# Patient Record
Sex: FEMALE | Race: BLACK OR AFRICAN AMERICAN | Employment: FULL TIME | URBAN - METROPOLITAN AREA
[De-identification: names, ages, dates, MRNs, and addresses within clinical notes are randomized per-mention and may not be internally consistent; named-entity substitution may affect disease eponyms.]

---

## 2018-07-24 ENCOUNTER — APPOINTMENT (OUTPATIENT)
Dept: GENERAL RADIOLOGY | Age: 43
DRG: 310 | End: 2018-07-24
Payer: COMMERCIAL

## 2018-07-24 ENCOUNTER — HOSPITAL ENCOUNTER (INPATIENT)
Age: 43
LOS: 1 days | Discharge: HOME | DRG: 310 | End: 2018-07-25
Attending: EMERGENCY MEDICINE | Admitting: FAMILY MEDICINE
Payer: COMMERCIAL

## 2018-07-24 DIAGNOSIS — R00.0 TACHYCARDIA: Primary | ICD-10-CM

## 2018-07-24 PROBLEM — I47.9 PAROXYSMAL TACHYCARDIA (HCC): Status: ACTIVE | Noted: 2018-07-24

## 2018-07-24 LAB
ABSOLUTE EOS #: 0.1 K/UL (ref 0–0.4)
ABSOLUTE IMMATURE GRANULOCYTE: ABNORMAL K/UL (ref 0–0.3)
ABSOLUTE LYMPH #: 2.2 K/UL (ref 1–4.8)
ABSOLUTE MONO #: 0.6 K/UL (ref 0.1–1.3)
ALBUMIN SERPL-MCNC: 4.5 G/DL (ref 3.5–5.2)
ALBUMIN/GLOBULIN RATIO: ABNORMAL (ref 1–2.5)
ALP BLD-CCNC: 48 U/L (ref 35–104)
ALT SERPL-CCNC: 10 U/L (ref 5–33)
ANION GAP SERPL CALCULATED.3IONS-SCNC: 14 MMOL/L (ref 9–17)
AST SERPL-CCNC: 16 U/L
BASOPHILS # BLD: 1 % (ref 0–2)
BASOPHILS ABSOLUTE: 0.1 K/UL (ref 0–0.2)
BILIRUB SERPL-MCNC: 0.9 MG/DL (ref 0.3–1.2)
BUN BLDV-MCNC: 11 MG/DL (ref 6–20)
BUN/CREAT BLD: ABNORMAL (ref 9–20)
CALCIUM SERPL-MCNC: 9.4 MG/DL (ref 8.6–10.4)
CHLORIDE BLD-SCNC: 101 MMOL/L (ref 98–107)
CO2: 22 MMOL/L (ref 20–31)
CREAT SERPL-MCNC: 0.68 MG/DL (ref 0.5–0.9)
DIFFERENTIAL TYPE: ABNORMAL
EOSINOPHILS RELATIVE PERCENT: 1 % (ref 0–4)
GFR AFRICAN AMERICAN: >60 ML/MIN
GFR NON-AFRICAN AMERICAN: >60 ML/MIN
GFR SERPL CREATININE-BSD FRML MDRD: ABNORMAL ML/MIN/{1.73_M2}
GFR SERPL CREATININE-BSD FRML MDRD: ABNORMAL ML/MIN/{1.73_M2}
GLUCOSE BLD-MCNC: 98 MG/DL (ref 70–99)
HCT VFR BLD CALC: 36.5 % (ref 36–46)
HEMOGLOBIN: 13.3 G/DL (ref 12–16)
IMMATURE GRANULOCYTES: ABNORMAL %
LYMPHOCYTES # BLD: 28 % (ref 24–44)
MAGNESIUM: 2.3 MG/DL (ref 1.6–2.6)
MCH RBC QN AUTO: 32.5 PG (ref 26–34)
MCHC RBC AUTO-ENTMCNC: 36.4 G/DL (ref 31–37)
MCV RBC AUTO: 89.3 FL (ref 80–100)
MONOCYTES # BLD: 8 % (ref 1–7)
NRBC AUTOMATED: ABNORMAL PER 100 WBC
PDW BLD-RTO: 14.8 % (ref 11.5–14.9)
PLATELET # BLD: 496 K/UL (ref 150–450)
PLATELET ESTIMATE: ABNORMAL
PMV BLD AUTO: 8.8 FL (ref 6–12)
POTASSIUM SERPL-SCNC: 3.6 MMOL/L (ref 3.7–5.3)
RBC # BLD: 4.09 M/UL (ref 4–5.2)
RBC # BLD: ABNORMAL 10*6/UL
SEG NEUTROPHILS: 62 % (ref 36–66)
SEGMENTED NEUTROPHILS ABSOLUTE COUNT: 4.8 K/UL (ref 1.3–9.1)
SODIUM BLD-SCNC: 137 MMOL/L (ref 135–144)
TOTAL PROTEIN: 7.5 G/DL (ref 6.4–8.3)
TROPONIN INTERP: NORMAL
TROPONIN T: <0.03 NG/ML
TSH SERPL DL<=0.05 MIU/L-ACNC: 1.28 MIU/L (ref 0.3–5)
WBC # BLD: 7.9 K/UL (ref 3.5–11)
WBC # BLD: ABNORMAL 10*3/UL

## 2018-07-24 PROCEDURE — 2060000000 HC ICU INTERMEDIATE R&B

## 2018-07-24 PROCEDURE — 83735 ASSAY OF MAGNESIUM: CPT

## 2018-07-24 PROCEDURE — 71045 X-RAY EXAM CHEST 1 VIEW: CPT

## 2018-07-24 PROCEDURE — 2580000003 HC RX 258: Performed by: FAMILY MEDICINE

## 2018-07-24 PROCEDURE — 6370000000 HC RX 637 (ALT 250 FOR IP): Performed by: FAMILY MEDICINE

## 2018-07-24 PROCEDURE — 2580000003 HC RX 258: Performed by: EMERGENCY MEDICINE

## 2018-07-24 PROCEDURE — G0378 HOSPITAL OBSERVATION PER HR: HCPCS

## 2018-07-24 PROCEDURE — 99285 EMERGENCY DEPT VISIT HI MDM: CPT

## 2018-07-24 PROCEDURE — 36415 COLL VENOUS BLD VENIPUNCTURE: CPT

## 2018-07-24 PROCEDURE — 80053 COMPREHEN METABOLIC PANEL: CPT

## 2018-07-24 PROCEDURE — 93005 ELECTROCARDIOGRAM TRACING: CPT

## 2018-07-24 PROCEDURE — 84484 ASSAY OF TROPONIN QUANT: CPT

## 2018-07-24 PROCEDURE — 84443 ASSAY THYROID STIM HORMONE: CPT

## 2018-07-24 PROCEDURE — 85025 COMPLETE CBC W/AUTO DIFF WBC: CPT

## 2018-07-24 RX ORDER — ACETAMINOPHEN 325 MG/1
650 TABLET ORAL EVERY 4 HOURS PRN
Status: DISCONTINUED | OUTPATIENT
Start: 2018-07-24 | End: 2018-07-25 | Stop reason: HOSPADM

## 2018-07-24 RX ORDER — FAMOTIDINE 20 MG/1
20 TABLET, FILM COATED ORAL 2 TIMES DAILY
Status: DISCONTINUED | OUTPATIENT
Start: 2018-07-25 | End: 2018-07-25 | Stop reason: HOSPADM

## 2018-07-24 RX ORDER — 0.9 % SODIUM CHLORIDE 0.9 %
1000 INTRAVENOUS SOLUTION INTRAVENOUS ONCE
Status: COMPLETED | OUTPATIENT
Start: 2018-07-24 | End: 2018-07-24

## 2018-07-24 RX ORDER — ALPRAZOLAM 0.25 MG/1
0.25 TABLET ORAL NIGHTLY PRN
Status: DISCONTINUED | OUTPATIENT
Start: 2018-07-25 | End: 2018-07-24

## 2018-07-24 RX ORDER — MAGNESIUM SULFATE 1 G/100ML
1 INJECTION INTRAVENOUS PRN
Status: DISCONTINUED | OUTPATIENT
Start: 2018-07-24 | End: 2018-07-25 | Stop reason: HOSPADM

## 2018-07-24 RX ORDER — ONDANSETRON 2 MG/ML
4 INJECTION INTRAMUSCULAR; INTRAVENOUS EVERY 6 HOURS PRN
Status: DISCONTINUED | OUTPATIENT
Start: 2018-07-24 | End: 2018-07-25 | Stop reason: HOSPADM

## 2018-07-24 RX ORDER — SODIUM CHLORIDE 0.9 % (FLUSH) 0.9 %
10 SYRINGE (ML) INJECTION PRN
Status: DISCONTINUED | OUTPATIENT
Start: 2018-07-24 | End: 2018-07-25 | Stop reason: HOSPADM

## 2018-07-24 RX ORDER — AMLODIPINE BESYLATE 5 MG/1
5 TABLET ORAL DAILY
COMMUNITY

## 2018-07-24 RX ORDER — SODIUM CHLORIDE 0.9 % (FLUSH) 0.9 %
10 SYRINGE (ML) INJECTION EVERY 12 HOURS SCHEDULED
Status: DISCONTINUED | OUTPATIENT
Start: 2018-07-25 | End: 2018-07-25 | Stop reason: HOSPADM

## 2018-07-24 RX ORDER — SODIUM CHLORIDE 0.9 % (FLUSH) 0.9 %
10 SYRINGE (ML) INJECTION EVERY 12 HOURS SCHEDULED
Status: DISCONTINUED | OUTPATIENT
Start: 2018-07-24 | End: 2018-07-25 | Stop reason: HOSPADM

## 2018-07-24 RX ORDER — MECLIZINE HYDROCHLORIDE 25 MG/1
25 TABLET ORAL 3 TIMES DAILY PRN
Status: DISCONTINUED | OUTPATIENT
Start: 2018-07-24 | End: 2018-07-25 | Stop reason: HOSPADM

## 2018-07-24 RX ORDER — ALBUTEROL SULFATE 2.5 MG/3ML
2.5 SOLUTION RESPIRATORY (INHALATION) EVERY 4 HOURS
Status: DISCONTINUED | OUTPATIENT
Start: 2018-07-25 | End: 2018-07-25

## 2018-07-24 RX ORDER — ALPRAZOLAM 0.25 MG/1
0.25 TABLET ORAL NIGHTLY PRN
Status: DISCONTINUED | OUTPATIENT
Start: 2018-07-24 | End: 2018-07-25 | Stop reason: HOSPADM

## 2018-07-24 RX ORDER — TOPIRAMATE 25 MG/1
25 TABLET ORAL 2 TIMES DAILY
Status: DISCONTINUED | OUTPATIENT
Start: 2018-07-25 | End: 2018-07-25 | Stop reason: HOSPADM

## 2018-07-24 RX ORDER — AMLODIPINE BESYLATE 5 MG/1
5 TABLET ORAL DAILY
Status: DISCONTINUED | OUTPATIENT
Start: 2018-07-25 | End: 2018-07-25 | Stop reason: HOSPADM

## 2018-07-24 RX ORDER — POTASSIUM CHLORIDE 7.45 MG/ML
10 INJECTION INTRAVENOUS PRN
Status: DISCONTINUED | OUTPATIENT
Start: 2018-07-24 | End: 2018-07-25 | Stop reason: HOSPADM

## 2018-07-24 RX ORDER — IBUPROFEN 600 MG/1
600 TABLET ORAL EVERY 6 HOURS PRN
Status: DISCONTINUED | OUTPATIENT
Start: 2018-07-24 | End: 2018-07-25 | Stop reason: HOSPADM

## 2018-07-24 RX ADMIN — ALPRAZOLAM 0.25 MG: 0.25 TABLET ORAL at 23:46

## 2018-07-24 RX ADMIN — Medication 10 ML: at 23:49

## 2018-07-24 RX ADMIN — SODIUM CHLORIDE 1000 ML: 9 INJECTION, SOLUTION INTRAVENOUS at 18:14

## 2018-07-24 ASSESSMENT — ENCOUNTER SYMPTOMS
SHORTNESS OF BREATH: 0
NAUSEA: 0
COUGH: 0
ABDOMINAL PAIN: 0
VOMITING: 0

## 2018-07-24 ASSESSMENT — PAIN SCALES - GENERAL
PAINLEVEL_OUTOF10: 7
PAINLEVEL_OUTOF10: 3

## 2018-07-24 ASSESSMENT — PAIN DESCRIPTION - LOCATION
LOCATION: HEAD
LOCATION: HEAD

## 2018-07-24 ASSESSMENT — PAIN DESCRIPTION - PAIN TYPE: TYPE: ACUTE PAIN

## 2018-07-24 NOTE — ED NOTES
Pt states she has been having ongoing racing heart/tachycardia \"since the 4th of July. \"  Pt states she was at work today at [employer] and her [address] showed her pulse = 187 around 10:00. Pt states she felt lightheaded and anxious at the same time. Pt states her pulse dropped to the 130's - 140's for about an hour and still felt lightheaded. Pt states she then had \"up and down\" heart rate t/o the rest of the day. Pt is brought to this ER by her . Pulse is regular et strong with s1 and s2 heart tones. Lung sounds clear t/o bilat. Pt has no visible peripheral edema.                Zack Burrell RN  07/24/18 2223

## 2018-07-24 NOTE — ED PROVIDER NOTES
[address] [facility]  eMERGENCY dEPARTMENT eNCOUnter      Pt Name: Papo Quiros  MRN: [id number]  Eugeniagfhumberto 1975  Date of evaluation: 7/24/18      CHIEF COMPLAINT       Chief Complaint   Patient presents with    Tachycardia    Headache     HISTORY OF PRESENT ILLNESS   HPI 37 y.o. female with a history of multiple sclerosis presents with complaints of tachycardia. The patient states that she was at work today and she had intermittent episodes of heart palpitations, headache and tachycardia. She wears an apple watch, and her heart rate would accelerate to approximately 180 beats a minute, would last for about a minute, and then go back to normal.  This happened several times throughout the afternoon. She went to her doctor's office, discussed the symptoms with him, and he recommended that she come to the emergency department. The patient states that she's been having similar symptoms over the last few weeks ever since the Fourth of July, but they have not been as frequent as today. She says that it has probably happened 3-4 times over the last 2 weeks, but today it has happened several times. The patient did recently start on a new medication for her multiple sclerosis, she is taking Ocrevus which Started in June 2018 Previous took Dimethyl Fumarate    She states that she also does take Adderall which she takes for fatigue related to her multiple sclerosis. She did take some Adderall earlier today. Review of Systems   Constitutional: Negative for chills and fever. HENT: Negative for congestion. Eyes: Negative for visual disturbance. Respiratory: Negative for cough and shortness of breath. Cardiovascular: Positive for palpitations. Negative for chest pain. Gastrointestinal: Negative for abdominal pain, nausea and vomiting. Genitourinary: Negative for dysuria. Musculoskeletal: Negative for arthralgias. Skin: Negative for rash.    Neurological: Positive for numbness (extremities and face which is typical for her MS flares). Negative for headaches. Hematological: Negative for adenopathy. PAST MEDICAL HISTORY     Past Medical History:   Diagnosis Date    Anxiety     Multiple sclerosis ([address]        SURGICAL HISTORY       Past Surgical History:   Procedure Laterality Date    DILATION AND CURETTAGE OF UTERUS  1974       CURRENT MEDICATIONS       Current Discharge Medication List      CONTINUE these medications which have NOT CHANGED    Details   amLODIPine (NORVASC) 5 MG tablet Take 5 mg by mouth daily      ALPRAZolam (XANAX) 0.25 MG tablet Take 0.25 mg by mouth nightly as needed for Sleep      Omeprazole Magnesium (PRILOSEC OTC PO) Take  by mouth. Use as directed/  OTC  Dr. Ozzie Muñiz       meclizine (ANTIVERT) 25 MG tablet Take 1 tablet by mouth 3 times daily as needed for Dizziness  Qty: 20 tablet, Refills: 0      ondansetron (ZOFRAN ODT) 4 MG disintegrating tablet Take 1 tablet by mouth every 8 hours as needed for Nausea or Vomiting  Qty: 20 tablet, Refills: 0      ibuprofen (ADVIL;MOTRIN) 800 MG tablet Take 800 mg by mouth every 6 hours as needed. OB/GYN  [facility]              ALLERGIES     has No Known Allergies. FAMILY HISTORY     indicated that her mother is alive. SOCIAL HISTORY      reports that she has been smoking Cigarettes. She has a 15.00 pack-year smoking history. She does not have any smokeless tobacco history on file. She reports that she drinks alcohol. She reports that she does not use drugs.     PHYSICAL EXAM     INITIAL VITALS: /89   Pulse 75   Temp 97.9 °F (36.6 °C) (Oral)   Resp 16   Ht 5' 8\" (1.727 m)   Wt 166 lb (75.3 kg)   LMP 07/20/2018   SpO2 100%   BMI 25.24 kg/m²     Gen.: NAD  Head: Normocephalic, atraumatic  Eye: Pupils equal round reactive to light, no conjunctivitis  Neck: No adenopathy  Heart: Regular rate and rhythm no murmurs  Lungs: Clear to auscultation bilaterally, no respiratory distress  Chest wall: No crepitus, Abnormal; Notable for the following:        Result Value    Platelets 004 (*)     Monocytes 8 (*)     All other components within normal limits   COMPREHENSIVE METABOLIC PANEL - Abnormal; Notable for the following:     Potassium 3.6 (*)     All other components within normal limits   MAGNESIUM   TROPONIN   TSH WITH REFLEX       EMERGENCY DEPARTMENT COURSE:   Vitals:    Vitals:    07/24/18 1843 07/24/18 1945 07/24/18 2000 07/24/18 2105   BP: 129/89 128/87 127/75 136/89   Pulse: 78 71 69 75   Resp: 20 20 15 16   Temp:   97.9 °F (36.6 °C) 97.9 °F (36.6 °C)   TempSrc:   Oral Oral   SpO2: 100% 100% 100% 100%   Weight:       Height:           The patient was given the following medications while in the emergency department:  Orders Placed This Encounter   Medications    0.9 % sodium chloride bolus    sodium chloride flush 0.9 % injection 10 mL    sodium chloride flush 0.9 % injection 10 mL    acetaminophen (TYLENOL) tablet 650 mg    enoxaparin (LOVENOX) injection 40 mg     -------------------------  CRITICAL CARE:   CONSULTS: IP CONSULT TO PRIMARY CARE PROVIDER  IP CONSULT TO CARDIOLOGY  PROCEDURES: Procedures     FINAL IMPRESSION      1.  Tachycardia          DISPOSITION/PLAN   DISPOSITION Admitted 07/24/2018 07:51:55 PM      PATIENT REFERRED TO:  Margy Garcia MD  [place] [address] [address] [facility] [phone]            DISCHARGE MEDICATIONS:  Current Discharge Medication List            Licha Frank MD  Attending Emergency Physician                      Licha Frank MD  07/24/18 [phone]

## 2018-07-25 VITALS
BODY MASS INDEX: 25.33 KG/M2 | OXYGEN SATURATION: 100 % | WEIGHT: 167.11 LBS | TEMPERATURE: 98.4 F | DIASTOLIC BLOOD PRESSURE: 76 MMHG | HEART RATE: 70 BPM | RESPIRATION RATE: 16 BRPM | SYSTOLIC BLOOD PRESSURE: 118 MMHG | HEIGHT: 68 IN

## 2018-07-25 PROBLEM — F41.9 ANXIETY: Status: ACTIVE | Noted: 2018-07-25

## 2018-07-25 LAB
ANION GAP SERPL CALCULATED.3IONS-SCNC: 11 MMOL/L (ref 9–17)
BUN BLDV-MCNC: 12 MG/DL (ref 6–20)
BUN/CREAT BLD: ABNORMAL (ref 9–20)
CALCIUM SERPL-MCNC: 8.7 MG/DL (ref 8.6–10.4)
CHLORIDE BLD-SCNC: 106 MMOL/L (ref 98–107)
CO2: 22 MMOL/L (ref 20–31)
CREAT SERPL-MCNC: 0.65 MG/DL (ref 0.5–0.9)
EKG ATRIAL RATE: 63 BPM
EKG ATRIAL RATE: 79 BPM
EKG P AXIS: 55 DEGREES
EKG P AXIS: 56 DEGREES
EKG P-R INTERVAL: 134 MS
EKG P-R INTERVAL: 142 MS
EKG Q-T INTERVAL: 382 MS
EKG Q-T INTERVAL: 418 MS
EKG QRS DURATION: 78 MS
EKG QRS DURATION: 82 MS
EKG QTC CALCULATION (BAZETT): 427 MS
EKG QTC CALCULATION (BAZETT): 438 MS
EKG R AXIS: -6 DEGREES
EKG R AXIS: 4 DEGREES
EKG T AXIS: 18 DEGREES
EKG T AXIS: 20 DEGREES
EKG VENTRICULAR RATE: 63 BPM
EKG VENTRICULAR RATE: 79 BPM
GFR AFRICAN AMERICAN: >60 ML/MIN
GFR NON-AFRICAN AMERICAN: >60 ML/MIN
GFR SERPL CREATININE-BSD FRML MDRD: ABNORMAL ML/MIN/{1.73_M2}
GFR SERPL CREATININE-BSD FRML MDRD: ABNORMAL ML/MIN/{1.73_M2}
GLUCOSE BLD-MCNC: 104 MG/DL (ref 70–99)
LV EF: 68 %
LVEF MODALITY: NORMAL
POTASSIUM SERPL-SCNC: 3.9 MMOL/L (ref 3.7–5.3)
SODIUM BLD-SCNC: 139 MMOL/L (ref 135–144)

## 2018-07-25 PROCEDURE — 93306 TTE W/DOPPLER COMPLETE: CPT

## 2018-07-25 PROCEDURE — 93005 ELECTROCARDIOGRAM TRACING: CPT

## 2018-07-25 PROCEDURE — G0378 HOSPITAL OBSERVATION PER HR: HCPCS

## 2018-07-25 PROCEDURE — 6370000000 HC RX 637 (ALT 250 FOR IP): Performed by: FAMILY MEDICINE

## 2018-07-25 PROCEDURE — 36415 COLL VENOUS BLD VENIPUNCTURE: CPT

## 2018-07-25 PROCEDURE — 80048 BASIC METABOLIC PNL TOTAL CA: CPT

## 2018-07-25 PROCEDURE — 2580000003 HC RX 258: Performed by: FAMILY MEDICINE

## 2018-07-25 RX ORDER — ALBUTEROL SULFATE 2.5 MG/3ML
2.5 SOLUTION RESPIRATORY (INHALATION) EVERY 6 HOURS PRN
Status: DISCONTINUED | OUTPATIENT
Start: 2018-07-25 | End: 2018-07-25 | Stop reason: HOSPADM

## 2018-07-25 RX ORDER — TOPIRAMATE 25 MG/1
25 TABLET ORAL 2 TIMES DAILY
Qty: 60 TABLET | Refills: 0
Start: 2018-07-25

## 2018-07-25 RX ADMIN — TOPIRAMATE 25 MG: 25 TABLET, FILM COATED ORAL at 10:08

## 2018-07-25 RX ADMIN — IBUPROFEN 600 MG: 600 TABLET ORAL at 10:09

## 2018-07-25 RX ADMIN — AMLODIPINE BESYLATE 5 MG: 5 TABLET ORAL at 10:05

## 2018-07-25 RX ADMIN — FAMOTIDINE 20 MG: 20 TABLET ORAL at 10:04

## 2018-07-25 RX ADMIN — Medication 10 ML: at 10:09

## 2018-07-25 RX ADMIN — TOPIRAMATE 25 MG: 25 TABLET, FILM COATED ORAL at 00:54

## 2018-07-25 ASSESSMENT — PAIN SCALES - GENERAL: PAINLEVEL_OUTOF10: 7

## 2018-07-25 NOTE — CARE COORDINATION
CASE MANAGEMENT NOTE:    Admission Date:  7/24/2018 Jaymie Espana is a 37 y.o.  female    Admitted for : Paroxysmal tachycardia ([address] [I47.9]    Met with:  Patient    PCP:  Dr. Hemal Serrato:  [facility]      Current Residence/ Living Arrangements:  independently at home             Current Services PTA:  No    Is patient agreeable to VNS: No    Freedom of choice provided: No         VNS chosen:  No    DME:  none    Home Oxygen: No    Nebulizer: No    Supplier: N/A    Potential Assistance Needed: No    SNF needed: No    Pharmacy:  [address] [place] [address],[address]       Does Patient want to use MEDS to BEDS? No    Family Members/Caregivers that pt would like involved in their care:    Yes    If yes, list name here:   John Oropeza    Transportation Provider:  Patient                      Discharge Plan:  7/25/18 [facility] Pt. Lives in 1 story home w/ steps w/ . No DME, Denies VNS. Cardio to see. Most likely DC home today w/ no needs.  Will follow//KB                 Electronically signed by: Trent Acevedo RN on 7/25/2018 at 1:50 PM

## 2018-07-25 NOTE — CONSULTS
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  She left the office and drove to her PCP office. In her PCP office heart heart rate was about 120 bpm.  And her heart rate was 180 bpm she did not experienced any palpitation and she did not check her pulse. She told me in that her heart rate fluctuates very often, according to her Apple watch heart heart rate can be 140 bpm and within few seconds 40 bpm. she performs her day-to-day activities without any significant limitation. Most of the heavy duty work is done by  since she was diagnosed as a case of emesis about 3 years ago. She also gets episodes of numbness and tingling over the face and lips and sometime for body and her neurologist has given her prescription for steroids but she hasn't started yet. No paroxysmal nocturnal dyspnea and is she never woke up with the palpitation while asleep. No syncope. She was diagnosed as a case of high blood pressure about 6 months ago and she has been taking amlodipine. Since admission no episodes of tachycardia arrhythmia noted.   On admission blood pressure was 144/94 and heart rate about 106 bpm.    On admission her electrocardiogram showed normal sinus rhythm  Serum potassium was 3.6, troponin less than 0.03, hemoglobin 13.3 and platelets 586  Chest x-ray didn't show any acute process    /76   Pulse 70   Temp 98.1 °F (36.7 °C) (Oral)   Resp 16   Ht 5' 8\"

## 2018-07-25 NOTE — H&P
History and Physical      Name: Brain Ponce  MRN: [id number]     Acct: [de-identified]  Room: 2093/2093-01    Admit Date: 7/24/2018  PCP: Darrick Marks MD      Chief Complaint:     Chief Complaint   Patient presents with    Tachycardia    Headache       History Obtained From:     patient, electronic medical record    History of Present Illness:      Brain Ponce is a  37 y.o.  female  with PMH as noted below who presents with Tachycardia and Headache   this has been ongoing for about a few months now but worsening lately. Reports that her blood pressure has been ranging normal and then was sudden was given to the 130s and up to 188 yesterday  Prompting her to go to her PCP office for evaluation and was sent to the ER for further evaluation. Associated symptoms include anxiety, chest discomfort which is nonradiating and has improved at this time per patient, fatigue, intermittent lightheadedness like she is going to pass out. She reports tachycardia also cause while rest.  This is usually picked up by her electronic device watch/cellphone. Other symptoms include paresthesias in her left extremities which she describes as similar to her MS flare-up, so was prescribed steroid by had neurology is but she has not started taking it yet. Her last MRI did brain done earlier this year did not show any new lesion per patient. She denies any fever, chills, nausea, vomiting, diarrhea, constipation, illicit drug use, excessive caffeine use, or syncope. She denies any prior cardiac history. She reports mild caffeine use but usually about half a cup a day. She also reports recent change in her MS medication which usually get by infusion and this was done about 2-3 months ago. She also reports taking Adderall for fatigue due to MS but was off of it for weeks until yesterday when she took while and because she was getting really tired.     In the ER, her initial vital signs were stable except for mild elevated blood pressure. EKG on admission showed normal sinus rhythm with nonspecific ST changes, but repeat EKG this morning showed normal sinus rhythm. No significant event noted on telemetry overnight. Chest x-ray was unremarkable. Blood test was unremarkable except for mildly low potassium which was replaced in the ER. Troponin and TSH were also normal.    Past Medical History:     Past Medical History:   Diagnosis Date    Anxiety     Multiple sclerosis ([address]         Past Surgical History:     Past Surgical History:   Procedure Laterality Date    DILATION AND CURETTAGE OF UTERUS  1974        Medications Prior to Admission:       Prior to Admission medications    Medication Sig Start Date End Date Taking? Authorizing Provider   amLODIPine (NORVASC) 5 MG tablet Take 5 mg by mouth daily   Yes Historical Provider, MD   ALPRAZolam (XANAX) 0.25 MG tablet Take 0.25 mg by mouth nightly as needed for Sleep   Yes Historical Provider, MD   Omeprazole Magnesium (PRILOSEC OTC PO) Take  by mouth. Use as directed/  OTC  Dr. Loco Pollard MD   meclizine (ANTIVERT) 25 MG tablet Take 1 tablet by mouth 3 times daily as needed for Dizziness 9/4/15   Maritza Reveal, APRN - CNP   ondansetron (ZOFRAN ODT) 4 MG disintegrating tablet Take 1 tablet by mouth every 8 hours as needed for Nausea or Vomiting 9/4/15   Maritza Reveal, APRN - CNP   ibuprofen (ADVIL;MOTRIN) 800 MG tablet Take 800 mg by mouth every 6 hours as needed. Marc Gauthier MD        Allergies:       Patient has no known allergies. Social History:     Tobacco:    reports that she has been smoking Cigarettes. She has a 15.00 pack-year smoking history. She does not have any smokeless tobacco history on file. Alcohol:      reports that she drinks alcohol. Drug Use:  reports that she does not use drugs.     Family History:     Family History   Problem Relation Age of Onset    Heart Disease Mother    [facility] Other Mother         fibromyalgia       Review of Systems:      All 10 systems reviewed and found negative except as noted in the HPI    Code Status:  Full Code    Physical Exam:     Vitals:  /81   Pulse 63   Temp 98.1 °F (36.7 °C) (Oral)   Resp 16   Ht 5' 8\" (1.727 m)   Wt 167 lb 1.7 oz (75.8 kg)   LMP 2018   SpO2 100%   BMI 25.41 kg/m²   Temp (24hrs), Av.9 °F (36.6 °C), Min:97.7 °F (36.5 °C), Max:98.1 °F (36.7 °C)      Physical exam  GEN: AAOx3, NAD  HEENT: NC/AT, mucus membrane moist, EOMI, PERRLA, no scleral icterus  NECK:  Supple, trachea is midline  CHEST:  Good air entry, no wheezes, no crackles  CVS:  RRR, S1 S2 present, no murmurs  ABD: +BS, soft, nontender, nondistended, no hepatosplenomegaly  NEURO: no focal weakness, CN 2-12 grossly intact, no gross motor deficit noted  MUSK: ROM full  Extremities: no edema, redness, or calf tenderness  SKIN:  Dry, warm, intact  PSYCH: mood normal        Data:     Labs--Reviewed:  Recent Results (from the past 24 hour(s))   EKG 12 Lead    Collection Time: 18  6:05 PM   Result Value Ref Range    Ventricular Rate 79 BPM    Atrial Rate 79 BPM    P-R Interval 134 ms    QRS Duration 82 ms    Q-T Interval 382 ms    QTc Calculation (Bazett) 438 ms    P Axis 55 degrees    R Axis -6 degrees    T Axis 18 degrees   CBC with DIFF    Collection Time: 18  6:09 PM   Result Value Ref Range    WBC 7.9 3.5 - 11.0 k/uL    RBC 4.09 4.0 - 5.2 m/uL    Hemoglobin 13.3 12.0 - 16.0 g/dL    Hematocrit 36.5 36 - 46 %    MCV 89.3 80 - 100 fL    MCH 32.5 26 - 34 pg    MCHC 36.4 31 - 37 g/dL    RDW 14.8 11.5 - 14.9 %    Platelets 358 (H) 052 - 450 k/uL    MPV 8.8 6.0 - 12.0 fL    NRBC Automated NOT REPORTED per 100 WBC    Differential Type NOT REPORTED     Immature Granulocytes NOT REPORTED 0 %    Absolute Immature Granulocyte NOT REPORTED 0.00 - 0.30 k/uL    WBC Morphology NOT REPORTED     RBC Morphology NOT REPORTED     Platelet Estimate NOT REPORTED     Seg

## 2018-07-25 NOTE — FLOWSHEET NOTE
07/24/18 2146   Provider Notification   Reason for Communication Review case   Provider Name Dr. Sarah Palma   Provider Notification Physician   Method of Communication Call   Notification Time 2146     Writer spoke with Dr. Sarah Palma for admission orders. Physician to input orders.

## 2018-07-25 NOTE — PROGRESS NOTES
2d ECHO DONE. Patient is very eager to leave. Per Dr. Cash Blake, she is OK to go home and obtain the 2D echo results on follow up appointment.   RN paged primary to obtain DC order

## 2018-07-25 NOTE — PLAN OF CARE
Problem: Pain:  Goal: Pain level will decrease  Pain level will decrease   Outcome: Ongoing      Problem: Falls - Risk of:  Goal: Will remain free from falls  Will remain free from falls   Outcome: Met This Shift  The patient remained free from falls this shift, call light within reach, bed in locked and lowest position. Side rails up x2. Patient able to get up per self. Problem: Cardiac Output - Decreased:  Goal: Hemodynamic stability will improve  Hemodynamic stability will improve   Outcome: Ongoing  VS stable this shift.

## 2018-07-25 NOTE — FLOWSHEET NOTE
07/24/18 2115   Provider Notification   Reason for Communication Review case;New orders   Provider Name Dr. Sharath Mcarthur   Provider Notification Physician   Method of Communication Call   Response See orders   Notification Time 2115     Dr. Sharath Mcarthur notified of new consult.  Order received for repeat EKG in AM.

## 2018-07-25 NOTE — PROGRESS NOTES
ADMISSION NOTE       Patient admitted to room  2093. Time of admit:  2050    Admit from:  ER    Reason for admission:  Tachycardia     Where patient has been residing for the last 24 hrs:  Private residence     Has the patient been admitted to any facility in the last 4 weeks, which one:  no    Family at bedside:  no    Patient is currently resting in bed, vitals obtained, telemetry placed on pt. No distress noted. Patient has been oriented to room, educated on how to use call light, and to call for assistance prior to getting up. Bed in lowest and locked position. 2 siderails up for safety. Call light within reach.

## 2018-07-26 LAB
EKG ATRIAL RATE: 66 BPM
EKG P AXIS: 59 DEGREES
EKG P-R INTERVAL: 148 MS
EKG Q-T INTERVAL: 412 MS
EKG QRS DURATION: 76 MS
EKG QTC CALCULATION (BAZETT): 431 MS
EKG R AXIS: 2 DEGREES
EKG T AXIS: 18 DEGREES
EKG VENTRICULAR RATE: 66 BPM